# Patient Record
Sex: MALE | Race: WHITE | NOT HISPANIC OR LATINO | URBAN - METROPOLITAN AREA
[De-identification: names, ages, dates, MRNs, and addresses within clinical notes are randomized per-mention and may not be internally consistent; named-entity substitution may affect disease eponyms.]

---

## 2019-05-02 ENCOUNTER — INPATIENT HOSPITAL (OUTPATIENT)
Dept: URBAN - METROPOLITAN AREA HOSPITAL 107 | Facility: HOSPITAL | Age: 79
End: 2019-05-02
Payer: MEDICARE

## 2019-05-02 DIAGNOSIS — K92.1 MELENA: ICD-10-CM

## 2019-05-02 DIAGNOSIS — D64.9 ANEMIA, UNSPECIFIED: ICD-10-CM

## 2019-05-02 PROCEDURE — 99223 1ST HOSP IP/OBS HIGH 75: CPT | Performed by: INTERNAL MEDICINE

## 2019-05-03 ENCOUNTER — INPATIENT HOSPITAL (OUTPATIENT)
Dept: URBAN - METROPOLITAN AREA HOSPITAL 107 | Facility: HOSPITAL | Age: 79
End: 2019-05-03

## 2019-05-03 DIAGNOSIS — K92.1 MELENA: ICD-10-CM

## 2019-05-03 DIAGNOSIS — D64.9 ANEMIA, UNSPECIFIED: ICD-10-CM

## 2019-05-03 PROCEDURE — 43235 EGD DIAGNOSTIC BRUSH WASH: CPT | Performed by: INTERNAL MEDICINE

## 2019-05-04 ENCOUNTER — INPATIENT HOSPITAL (OUTPATIENT)
Dept: URBAN - METROPOLITAN AREA HOSPITAL 107 | Facility: HOSPITAL | Age: 79
End: 2019-05-04
Payer: MEDICARE

## 2019-05-04 DIAGNOSIS — K57.30 DIVERTICULOSIS OF LARGE INTESTINE WITHOUT PERFORATION OR ABS: ICD-10-CM

## 2019-05-04 DIAGNOSIS — K92.1 MELENA: ICD-10-CM

## 2019-05-04 DIAGNOSIS — D64.9 ANEMIA, UNSPECIFIED: ICD-10-CM

## 2019-05-04 DIAGNOSIS — K64.8 OTHER HEMORRHOIDS: ICD-10-CM

## 2019-05-04 DIAGNOSIS — D12.3 BENIGN NEOPLASM OF TRANSVERSE COLON: ICD-10-CM

## 2019-05-04 PROCEDURE — 45380 COLONOSCOPY AND BIOPSY: CPT | Performed by: INTERNAL MEDICINE

## 2019-05-05 ENCOUNTER — INPATIENT HOSPITAL (OUTPATIENT)
Dept: URBAN - METROPOLITAN AREA HOSPITAL 107 | Facility: HOSPITAL | Age: 79
End: 2019-05-05

## 2019-05-05 DIAGNOSIS — K92.1 MELENA: ICD-10-CM

## 2019-05-05 DIAGNOSIS — D64.9 ANEMIA, UNSPECIFIED: ICD-10-CM

## 2019-05-05 PROCEDURE — 99232 SBSQ HOSP IP/OBS MODERATE 35: CPT | Performed by: INTERNAL MEDICINE

## 2019-05-06 ENCOUNTER — INPATIENT HOSPITAL (OUTPATIENT)
Dept: URBAN - METROPOLITAN AREA HOSPITAL 107 | Facility: HOSPITAL | Age: 79
End: 2019-05-06
Payer: MEDICARE

## 2019-05-06 DIAGNOSIS — R19.5 OTHER FECAL ABNORMALITIES: ICD-10-CM

## 2019-05-06 DIAGNOSIS — R63.4 ABNORMAL WEIGHT LOSS: ICD-10-CM

## 2019-05-06 DIAGNOSIS — K92.1 MELENA: ICD-10-CM

## 2019-05-06 DIAGNOSIS — D50.0 IRON DEFICIENCY ANEMIA SECONDARY TO BLOOD LOSS (CHRONIC): ICD-10-CM

## 2019-05-06 PROCEDURE — 99232 SBSQ HOSP IP/OBS MODERATE 35: CPT | Performed by: PHYSICIAN ASSISTANT

## 2019-05-07 PROCEDURE — 99231 SBSQ HOSP IP/OBS SF/LOW 25: CPT | Performed by: PHYSICIAN ASSISTANT

## 2019-05-21 ENCOUNTER — OFFICE (OUTPATIENT)
Dept: URBAN - METROPOLITAN AREA CLINIC 75 | Facility: CLINIC | Age: 79
End: 2019-05-21
Payer: MEDICARE

## 2019-05-21 DIAGNOSIS — D50.0 IRON DEFICIENCY ANEMIA SECONDARY TO BLOOD LOSS (CHRONIC): ICD-10-CM

## 2019-05-21 DIAGNOSIS — K55.20 ANGIODYSPLASIA OF COLON WITHOUT HEMORRHAGE: ICD-10-CM

## 2019-05-21 DIAGNOSIS — Z98.890 OTHER SPECIFIED POSTPROCEDURAL STATES: ICD-10-CM

## 2019-05-21 PROCEDURE — 91110 GI TRC IMG INTRAL ESOPH-ILE: CPT | Performed by: INTERNAL MEDICINE

## 2020-05-27 ENCOUNTER — OFFICE VISIT (OUTPATIENT)
Dept: ORTHOPEDIC SURGERY | Facility: CLINIC | Age: 80
End: 2020-05-27

## 2020-05-27 VITALS — BODY MASS INDEX: 23.4 KG/M2 | WEIGHT: 158 LBS | TEMPERATURE: 97.7 F | HEIGHT: 69 IN

## 2020-05-27 DIAGNOSIS — M25.571 CHRONIC PAIN OF RIGHT ANKLE: ICD-10-CM
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
## 2020-05-27 DIAGNOSIS — M25.471 RIGHT ANKLE SWELLING: Primary | ICD-10-CM

## 2020-05-27 DIAGNOSIS — G89.29 CHRONIC PAIN OF RIGHT ANKLE: ICD-10-CM

## 2020-05-27 PROCEDURE — 99204 OFFICE O/P NEW MOD 45 MIN: CPT | Performed by: PHYSICIAN ASSISTANT

## 2020-05-27 RX ORDER — WARFARIN SODIUM 5 MG/1
TABLET ORAL
COMMUNITY
Start: 2020-03-30

## 2020-05-27 RX ORDER — ACETAMINOPHEN 500 MG
1000 TABLET ORAL
COMMUNITY

## 2020-05-27 RX ORDER — SIMVASTATIN 20 MG
20 TABLET ORAL DAILY
COMMUNITY
Start: 2019-08-12

## 2020-05-27 RX ORDER — FINASTERIDE 5 MG/1
5 TABLET, FILM COATED ORAL DAILY
COMMUNITY

## 2020-05-27 RX ORDER — LEVOTHYROXINE SODIUM 0.07 MG/1
75 TABLET ORAL DAILY
COMMUNITY
Start: 2020-04-27

## 2020-05-27 RX ORDER — AMLODIPINE BESYLATE 10 MG/1
10 TABLET ORAL DAILY
COMMUNITY
Start: 2020-04-30

## 2020-05-27 RX ORDER — ASPIRIN 81 MG/1
81 TABLET ORAL DAILY
COMMUNITY

## 2020-05-27 NOTE — PROGRESS NOTES
NEW VISIT    Patient: Ron Brady  ?  YOB: 1940    MRN: [id number]  ?  Chief Complaint   Patient presents with   • Right Foot - Edema   • Right Ankle - Edema      ?  HPI:   79 y.o. male presents with right ankle swelling secondary to a fall off a ladder on 2/14/2020.  He reports initially he did not experience any pain or swelling until 2 to 3 days following the injury, at which time he experienced significant discomfort and swelling of the ankle.  He reports the pain was present for approximately 1 month but the ankle swelling has never resolved.  He reports he was unable to get a shoe on for several weeks due to the amount of swelling.  He also reports significant pain with any weightbearing during that timeframe.  He states he has worn compression socks but it does not seem to improve his swelling.  He also states the swelling is not improved with elevation of the leg.    Pain Location: right ankle  Radiation: none  Quality: No pain currently, only swelling  Intensity/Severity: moderate swelling  Duration: since 2/16/20-fall occurred on 2/14/2020  Onset quality: sudden  Timing: constant  Aggravating Factors: walking, standing  Alleviating Factors: rest  Previous Episodes: none mentioned  Associated Symptoms: pain, swelling  ADLs Affected: ambulating  Previous Treatment: Was seen at [facility] on 2/17/2020    This patient is a new patient.  This problem is new to this examiner.      Allergies: No Known Allergies    Medications:   Home Medications:  Current Outpatient Medications on File Prior to Visit   Medication Sig   • simvastatin (ZOCOR) 20 MG tablet Take 20 mg by mouth Daily.   • warfarin (COUMADIN) 5 MG tablet TAKE ONE & ONE-HALF TABLET BY MOUTH ON MONDAY, WEDNESDAY, AND FRIDAY AND TAKE ONE TABLET BY MOUTH ON ALL OTHER DAYS   • acetaminophen (TYLENOL) 500 MG tablet Take 1,000 mg by mouth.   • amLODIPine (NORVASC) 10 MG tablet Take 10 mg by mouth Daily.   • amlodipine-olmesartan  (DAVIDE) 5-20 MG per tablet Take 1 tablet by mouth every morning.   • aspirin 81 MG EC tablet Take 81 mg by mouth Daily.   • finasteride (PROSCAR) 5 MG tablet Take 5 mg by mouth Daily.   • levothyroxine (SYNTHROID, LEVOTHROID) 75 MCG tablet Take 75 mcg by mouth Daily.   • Multiple Vitamins-Minerals (MULTIVITAMIN PO) Take 1 tablet by mouth every morning.   • warfarin (COUMADIN) 5 MG tablet TAKE ONE & ONE-HALF TABLET BY MOUTH ON MONDAY, WEDNESDAY, AND FRIDAY AND TAKE ONE TABLET BY MOUTH ON ALL OTHER DAYS   • [DISCONTINUED] finasteride (PROSCAR) 5 MG tablet Take 5 mg by mouth every morning.   • [DISCONTINUED] simvastatin (ZOCOR) 20 MG tablet Take 20 mg by mouth every night.     No current facility-administered medications on file prior to visit.      Current Medications:  Scheduled Meds:  PRN Meds:.    I have reviewed the patient's medical history in detail and updated the computerized patient record.  Review and summarization of old records include:    Past Medical History:   Diagnosis Date   • Arthritis    • BPH (benign prostatic hyperplasia)    • Diverticulosis    • Heart murmur    • Hyperlipidemia    • Hypertension    • Left knee pain    • Swelling of extremity, left     Post op LTKR 3/17/16     Past Surgical History:   Procedure Laterality Date   • ACHILLES TENDON REPAIR Right    • BACK SURGERY     • TN TOTAL KNEE ARTHROPLASTY Left 3/17/2016    Procedure: LT TOTAL KNEE ARTHROPLASTY;  Surgeon: Lance Huber MD;  Location: [facility];  Service: Orthopedics     Social History     Occupational History   • Not on file   Tobacco Use   • Smoking status: Never Smoker   • Smokeless tobacco: Never Used   Substance and Sexual Activity   • Alcohol use: No   • Drug use: No   • Sexual activity: Defer      History reviewed. No pertinent family history.    Review of Systems  Constitutional: Negative.  Negative for fever.   Eyes: Negative.    Respiratory: Negative.    Cardiovascular: Negative.    Endocrine: Negative.   "  Musculoskeletal: Positive for ankle swelling.   Skin: Negative.  Negative for rash and wound.   Allergic/Immunologic: Negative.    Neurological: Negative for numbness.   Lymph: Positive for edema of right lower extremity  Hematological: Negative.    Psychiatric/Behavioral: Negative.           Wt Readings from Last 3 Encounters:   05/27/20 71.7 kg (158 lb)   03/17/16 71.1 kg (156 lb 12.8 oz)   03/08/16 71.7 kg (158 lb)     Ht Readings from Last 3 Encounters:   05/27/20 175.3 cm (69\")   03/17/16 175.3 cm (69\")   03/08/16 175.3 cm (69\")     Body mass index is 23.33 kg/m².  Facility age limit for growth percentiles is 20 years.  Vitals:    05/27/20 0948   Temp: 97.7 °F (36.5 °C)         Physical Exam  Constitutional: Patient is oriented to person, place, and time. Appears well-developed and well-nourished.   HENT:   Head: Normocephalic and atraumatic.   Eyes: Conjunctivae and EOM are normal. Pupils are equal, round, and reactive to light.   Cardiovascular: Normal rate and intact distal pulses.   Pulmonary/Chest: Effort normal and breath sounds normal.   Musculoskeletal:   See detailed exam below   Neurological: Alert and oriented to person, place, and time. No sensory deficit. Coordination normal.   Skin: Skin is warm and dry. Capillary refill takes less than 2 seconds. No rash noted. No erythema.   Lymph: Positive for 2+ pitting edema of the right lower extremity.  Psychiatric: Patient has a normal mood and affect. His behavior is normal. Judgment and thought content normal.   Nursing note and vitals reviewed.      Ortho Exam:   Negative for tenderness of the affected ankle.   Positive for swelling over the anterior talofibular ligament.   Positive for swelling at the medial aspect over deltoid ligament.   Negative for tenderness posteriorly over the calcaneal fibular ligament.   Negativefor tenderness over the calcaneal fibular ligament.   Distal tibiofibular syndesmotic ligament appears intact.  Inversion and " eversion against resistance are performed without pain  Negative external rotation and squeeze tests.   Dorsalis pedis and posterior tibial artery pulses are palpable.   Common peroneal nerve function is well preserved.   There is no evidence of a proximal fibular injury.    Positive 2+ pitting edema to the right lower extremity      Diagnostics:  Independently viewed and interpreted outside xrays of Right ankle and foot, from [facility] on 2/17/2020, my impression below:  · Evidence of soft tissue swelling  · No evidence of acute bony abnormality  · Joint spaces appear well preserved         Assessment:  Ron was seen today for edema and edema.    Diagnoses and all orders for this visit:    Right ankle swelling  -     MRI Ankle Right Without Contrast; Future    Chronic pain of right ankle  -     MRI Ankle Right Without Contrast; Future    Fall from ladder, initial encounter  -     MRI Ankle Right Without Contrast; Future        ?    Plan    · Discussed x-ray findings and symptoms following the injury, as well as current symptoms.  · Schedule right ankle MRI: Given knee symptoms and lack or response to conservative measures, will schedule MRI.  Discussed the possibility of the swelling being related to fluid retention since he does have pitting edema, although I believe it is worth checking into considering the swelling and ankle pain began the same time and 3 days after injury.  Patient prefers to have MRI at [facility] due to the location.    · He can continue with compression socks  · OTC Alternate Ibuprofen and Tylenol as needed  · Follow up will be based on when MRI has been performed    Ruben Merida PA-C  Date of encounter: 05/27/2020     Electronically signed by Ruben Merida PA-C, 05/27/20, 10:46 AM.

## 2020-06-24 ENCOUNTER — TELEPHONE (OUTPATIENT)
Dept: ORTHOPEDIC SURGERY | Facility: CLINIC | Age: 80
End: 2020-06-24

## 2020-07-09 ENCOUNTER — HOSPITAL ENCOUNTER (OUTPATIENT)
Dept: OTHER | Facility: HOSPITAL | Age: 80
Discharge: HOME | End: 2020-07-09
Attending: PHYSICIAN ASSISTANT

## 2020-07-15 ENCOUNTER — OFFICE VISIT (OUTPATIENT)
Dept: ORTHOPEDIC SURGERY | Facility: CLINIC | Age: 80
End: 2020-07-15

## 2020-07-15 VITALS — BODY MASS INDEX: 23.7 KG/M2 | WEIGHT: 160 LBS | HEIGHT: 69 IN | TEMPERATURE: 96.9 F

## 2020-07-15 DIAGNOSIS — G89.29 CHRONIC PAIN OF RIGHT ANKLE: Primary | ICD-10-CM

## 2020-07-15 DIAGNOSIS — M25.571 CHRONIC PAIN OF RIGHT ANKLE: Primary | ICD-10-CM

## 2020-07-15 DIAGNOSIS — M25.471 RIGHT ANKLE SWELLING: ICD-10-CM

## 2020-07-15 PROCEDURE — 99213 OFFICE O/P EST LOW 20 MIN: CPT | Performed by: PHYSICIAN ASSISTANT

## 2020-07-24 PROBLEM — K55.20 AVM (ARTERIOVENOUS MALFORMATION) OF SMALL BOWEL, ACQUIRED: Status: ACTIVE | Noted: 2019-08-12

## 2020-07-24 PROBLEM — I48.0 PAROXYSMAL ATRIAL FIBRILLATION (HCC): Status: ACTIVE | Noted: 2018-06-23

## 2020-07-24 PROBLEM — Z95.1 S/P CABG (CORONARY ARTERY BYPASS GRAFT): Status: ACTIVE | Noted: 2018-06-21

## 2020-07-24 PROBLEM — I25.10 CAD IN NATIVE ARTERY: Status: ACTIVE | Noted: 2018-06-07

## 2020-07-24 PROBLEM — E03.9 ACQUIRED HYPOTHYROIDISM: Status: ACTIVE | Noted: 2018-06-28

## 2020-07-24 PROBLEM — S06.5XAA TRAUMATIC SUBDURAL HEMATOMA (HCC): Status: ACTIVE | Noted: 2020-02-17

## 2020-07-24 PROBLEM — D50.9 IRON DEFICIENCY ANEMIA: Status: ACTIVE | Noted: 2017-10-03

## 2020-07-24 PROBLEM — Z79.01 LONG TERM CURRENT USE OF ANTICOAGULANT: Status: ACTIVE | Noted: 2018-11-08
